# Patient Record
Sex: MALE | Race: WHITE | URBAN - METROPOLITAN AREA
[De-identification: names, ages, dates, MRNs, and addresses within clinical notes are randomized per-mention and may not be internally consistent; named-entity substitution may affect disease eponyms.]

---

## 2019-10-09 ENCOUNTER — OFFICE VISIT (OUTPATIENT)
Dept: URBAN - METROPOLITAN AREA CLINIC 76 | Facility: CLINIC | Age: 24
End: 2019-10-09
Payer: COMMERCIAL

## 2019-10-09 DIAGNOSIS — H52.223 REGULAR ASTIGMATISM, BILATERAL: Primary | ICD-10-CM

## 2019-10-09 PROCEDURE — 92004 COMPRE OPH EXAM NEW PT 1/>: CPT | Performed by: OPTOMETRIST

## 2019-10-09 ASSESSMENT — VISUAL ACUITY
OS: 20/30
OD: 20/30

## 2019-10-09 ASSESSMENT — INTRAOCULAR PRESSURE
OD: 15
OS: 15

## 2019-10-09 NOTE — IMPRESSION/PLAN
Impression: Regular astigmatism, bilateral: H52.223 OU. Plan: A glasses prescription has been discussed and generated. Discussed adaption. Advised pt of legal driving requirements. Patient to call with any concerns.

## 2020-10-09 ENCOUNTER — OFFICE VISIT (OUTPATIENT)
Dept: URBAN - METROPOLITAN AREA CLINIC 76 | Facility: CLINIC | Age: 25
End: 2020-10-09
Payer: COMMERCIAL

## 2020-10-09 DIAGNOSIS — H10.45 OTHER CHRONIC ALLERGIC CONJUNCTIVITIS: ICD-10-CM

## 2020-10-09 PROCEDURE — 92310 CONTACT LENS FITTING OU: CPT | Performed by: OPTOMETRIST

## 2020-10-09 PROCEDURE — 92014 COMPRE OPH EXAM EST PT 1/>: CPT | Performed by: OPTOMETRIST

## 2020-10-09 ASSESSMENT — INTRAOCULAR PRESSURE
OD: 14
OS: 15

## 2020-10-09 ASSESSMENT — KERATOMETRY
OD: 41.38
OS: 41.13

## 2020-10-09 ASSESSMENT — VISUAL ACUITY
OS: 20/25
OD: 20/25

## 2020-10-09 NOTE — IMPRESSION/PLAN
Impression: Regular astigmatism, bilateral: H52.223. Bilateral. Plan: Discussed diagnosis with patient. Dispensed new MRx today. Order ctls trials then 1 week follow up. Pt to call with any concerns.

## 2020-12-07 ENCOUNTER — TESTING ONLY (OUTPATIENT)
Dept: URBAN - METROPOLITAN AREA CLINIC 76 | Facility: CLINIC | Age: 25
End: 2020-12-07

## 2020-12-07 PROCEDURE — 92310 CONTACT LENS FITTING OU: CPT | Performed by: OPTOMETRIST

## 2022-11-08 ENCOUNTER — REFRACTIVE (OUTPATIENT)
Dept: URBAN - METROPOLITAN AREA CLINIC 64 | Facility: CLINIC | Age: 27
End: 2022-11-08

## 2022-11-08 DIAGNOSIS — H52.13 MYOPIA, BILATERAL: Primary | ICD-10-CM

## 2022-11-08 ASSESSMENT — VISUAL ACUITY
OS: 20/20
OD: 20/20

## 2022-11-08 ASSESSMENT — KERATOMETRY
OS: 41.53
OD: 41.88

## 2022-11-08 ASSESSMENT — INTRAOCULAR PRESSURE
OD: 15
OS: 15

## 2022-12-02 ENCOUNTER — POST-OPERATIVE VISIT (OUTPATIENT)
Dept: URBAN - METROPOLITAN AREA CLINIC 66 | Facility: CLINIC | Age: 27
End: 2022-12-02

## 2022-12-02 DIAGNOSIS — Z48.810 ENCOUNTER FOR SURGICAL AFTERCARE FOLLOWING SURGERY ON A SENSE ORGAN: Primary | ICD-10-CM

## 2022-12-02 PROCEDURE — 99024 POSTOP FOLLOW-UP VISIT: CPT | Performed by: STUDENT IN AN ORGANIZED HEALTH CARE EDUCATION/TRAINING PROGRAM

## 2022-12-02 NOTE — IMPRESSION/PLAN
Impression: S/P LASIK OU - . Encounter for surgical aftercare following surgery on a sense organ  Z48.810. Plan: S/p LASIK OU. smooth Flaps OU. Discussed all postoperative instructions, follow up 1 week. -No hot tubs, pools, no eye rubbing, take all drops as directed. call with any concerns.

## 2022-12-09 ENCOUNTER — POST-OPERATIVE VISIT (OUTPATIENT)
Dept: URBAN - METROPOLITAN AREA CLINIC 64 | Facility: CLINIC | Age: 27
End: 2022-12-09

## 2022-12-09 DIAGNOSIS — Z48.810 ENCOUNTER FOR SURGICAL AFTERCARE FOLLOWING SURGERY ON A SENSE ORGAN: Primary | ICD-10-CM

## 2022-12-09 PROCEDURE — 99024 POSTOP FOLLOW-UP VISIT: CPT | Performed by: STUDENT IN AN ORGANIZED HEALTH CARE EDUCATION/TRAINING PROGRAM

## 2022-12-09 NOTE — IMPRESSION/PLAN
Impression: S/P LASIK - Standard OU - 7 Days. Encounter for surgical aftercare following surgery on a sense organ  Z48.810. Plan: s/p LASIK OU, doing well happy with vision, follow up 1 month.  OK to stop pred and oflox, patient can resume normal activity

## 2023-01-13 ENCOUNTER — POST-OPERATIVE VISIT (OUTPATIENT)
Dept: URBAN - METROPOLITAN AREA CLINIC 64 | Facility: CLINIC | Age: 28
End: 2023-01-13

## 2023-01-13 DIAGNOSIS — Z48.810 ENCOUNTER FOR SURGICAL AFTERCARE FOLLOWING SURGERY ON A SENSE ORGAN: Primary | ICD-10-CM

## 2023-01-13 PROCEDURE — 99024 POSTOP FOLLOW-UP VISIT: CPT | Performed by: STUDENT IN AN ORGANIZED HEALTH CARE EDUCATION/TRAINING PROGRAM

## 2023-01-13 ASSESSMENT — VISUAL ACUITY
OS: 20/20
OD: 20/20

## 2023-01-13 NOTE — IMPRESSION/PLAN
Impression: S/P LASIK - Standard OU - 42 Days. Encounter for surgical aftercare following surgery on a sense organ  Z48.810. Plan: S/P lasik , HAPPY WITH VISION OU. Lasik for Life plan. Follow up 6 months with [facility] in house.

## 2024-10-25 ENCOUNTER — OFFICE VISIT (OUTPATIENT)
Dept: URBAN - METROPOLITAN AREA CLINIC 33 | Facility: CLINIC | Age: 29
End: 2024-10-25
Payer: COMMERCIAL

## 2024-10-25 DIAGNOSIS — H52.223 REGULAR ASTIGMATISM, BILATERAL: Primary | ICD-10-CM

## 2024-10-25 PROCEDURE — 92004 COMPRE OPH EXAM NEW PT 1/>: CPT

## 2024-10-25 ASSESSMENT — VISUAL ACUITY
OS: 20/20
OD: 20/20

## 2024-10-25 ASSESSMENT — INTRAOCULAR PRESSURE
OD: 13
OS: 13